# Patient Record
Sex: FEMALE | Race: WHITE | URBAN - METROPOLITAN AREA
[De-identification: names, ages, dates, MRNs, and addresses within clinical notes are randomized per-mention and may not be internally consistent; named-entity substitution may affect disease eponyms.]

---

## 2023-01-19 ENCOUNTER — OFFICE VISIT (OUTPATIENT)
Dept: URBAN - METROPOLITAN AREA CLINIC 17 | Facility: CLINIC | Age: 78
End: 2023-01-19
Payer: MEDICARE

## 2023-01-19 DIAGNOSIS — H04.123 DRY EYE SYNDROME OF BILATERAL LACRIMAL GLANDS: ICD-10-CM

## 2023-01-19 DIAGNOSIS — H26.491 OTHER SECONDARY CATARACT OF RIGHT EYE: ICD-10-CM

## 2023-01-19 DIAGNOSIS — H33.323 ROUND HOLE OF RETINA, BILATERAL: Primary | ICD-10-CM

## 2023-01-19 PROCEDURE — 92004 COMPRE OPH EXAM NEW PT 1/>: CPT | Performed by: OPTOMETRIST

## 2023-01-19 PROCEDURE — 92134 CPTRZ OPH DX IMG PST SGM RTA: CPT | Performed by: OPTOMETRIST

## 2023-01-19 ASSESSMENT — INTRAOCULAR PRESSURE
OS: 13
OD: 14

## 2023-01-19 NOTE — IMPRESSION/PLAN
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 Plan: Discussed diagnosis in detail with patient. Discussed treatment options with patient. Consult recommended [[facility] Discussed no glasses Rx until after retinal consult.

## 2023-02-21 ENCOUNTER — OFFICE VISIT (OUTPATIENT)
Dept: URBAN - METROPOLITAN AREA CLINIC 17 | Facility: CLINIC | Age: 78
End: 2023-02-21
Payer: MEDICARE

## 2023-02-21 DIAGNOSIS — H35.343 MACULAR CYST, HOLE, OR PSEUDOHOLE, BILATERAL: Primary | ICD-10-CM

## 2023-02-21 DIAGNOSIS — H35.372 PUCKERING OF MACULA, LEFT EYE: ICD-10-CM

## 2023-02-21 PROCEDURE — 92134 CPTRZ OPH DX IMG PST SGM RTA: CPT | Performed by: OPHTHALMOLOGY

## 2023-02-21 PROCEDURE — 99202 OFFICE O/P NEW SF 15 MIN: CPT | Performed by: OPHTHALMOLOGY

## 2023-02-21 RX ORDER — OFLOXACIN 3 MG/ML
0.3 % SOLUTION/ DROPS OPHTHALMIC
Qty: 5 | Refills: 1 | Status: ACTIVE
Start: 2023-02-21

## 2023-02-21 RX ORDER — PREDNISOLONE ACETATE 10 MG/ML
1 % SUSPENSION/ DROPS OPHTHALMIC
Qty: 10 | Refills: 1 | Status: ACTIVE
Start: 2023-02-21

## 2023-02-21 ASSESSMENT — INTRAOCULAR PRESSURE
OS: 14
OD: 14

## 2023-02-21 NOTE — IMPRESSION/PLAN
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 Left. Condition: established, worsening. Plan: Discussed diagnosis in detail with patient. Discussed risks of progression. Recommend surgery OS - see notes above.

## 2023-02-21 NOTE — IMPRESSION/PLAN
Impression: Macular cyst, hole, or pseudohole, bilateral: H35.343. Bilateral. Condition: Chronic, Long standing MH OS, resolved w/ previous Sx OD. Vision Affected Hx of combo CE w/ PPV for MH OD w/ Dr Belén Casas: Discussed diagnosis in detail with patient. Exam OS shows a large to moderate size macular hole. OCT OS shows Macular hole  Discussed risks of progression. Surgical treatment is recommended to repair the retina PPVx LEFT EYE. Surgical risks and benefits were discussed, explained and understood by patient. Unable to tell how much vision will be recovered. Discussed with patient that the macular hole has been there a long time, and give about a 50/50% chance of improvement in vision. Head should be upright, or lay face down while sleeping after surgery. Discussed gas bubble and post-op care: no traveling, flying or high altitude for approximately 6 - 8 weeks. All questions answered. Patient elects to proceed with recommendation. RL1. Drops Erx to pharmacy on file.